# Patient Record
Sex: MALE | Race: BLACK OR AFRICAN AMERICAN | NOT HISPANIC OR LATINO | Employment: FULL TIME | URBAN - NONMETROPOLITAN AREA
[De-identification: names, ages, dates, MRNs, and addresses within clinical notes are randomized per-mention and may not be internally consistent; named-entity substitution may affect disease eponyms.]

---

## 2024-08-20 ENCOUNTER — OFFICE VISIT (OUTPATIENT)
Dept: CARDIOLOGY | Facility: CLINIC | Age: 54
End: 2024-08-20
Payer: COMMERCIAL

## 2024-08-20 VITALS
BODY MASS INDEX: 27.46 KG/M2 | SYSTOLIC BLOOD PRESSURE: 134 MMHG | DIASTOLIC BLOOD PRESSURE: 67 MMHG | HEIGHT: 74 IN | WEIGHT: 214 LBS | HEART RATE: 80 BPM

## 2024-08-20 DIAGNOSIS — E78.2 MIXED HYPERLIPIDEMIA: Primary | ICD-10-CM

## 2024-08-20 DIAGNOSIS — R94.31 ABNORMAL EKG: ICD-10-CM

## 2024-08-20 DIAGNOSIS — I10 PRIMARY HYPERTENSION: ICD-10-CM

## 2024-08-20 PROCEDURE — 99204 OFFICE O/P NEW MOD 45 MIN: CPT | Performed by: INTERNAL MEDICINE

## 2024-08-20 PROCEDURE — 93000 ELECTROCARDIOGRAM COMPLETE: CPT | Performed by: INTERNAL MEDICINE

## 2024-08-20 RX ORDER — ATORVASTATIN CALCIUM 10 MG/1
1 TABLET, FILM COATED ORAL DAILY
COMMUNITY
Start: 2024-08-12

## 2024-08-20 RX ORDER — AMLODIPINE BESYLATE 5 MG/1
5 TABLET ORAL DAILY
COMMUNITY
Start: 2024-05-24

## 2024-08-20 RX ORDER — CETIRIZINE HYDROCHLORIDE 10 MG/1
1 TABLET ORAL DAILY
COMMUNITY
Start: 2024-07-29

## 2024-08-20 RX ORDER — ONDANSETRON 4 MG/1
4 TABLET, FILM COATED ORAL
COMMUNITY

## 2024-08-20 RX ORDER — OMEPRAZOLE 40 MG/1
1 CAPSULE, DELAYED RELEASE ORAL DAILY
COMMUNITY
Start: 2024-07-29

## 2024-08-20 RX ORDER — TESTOSTERONE CYPIONATE 200 MG/ML
INJECTION, SOLUTION INTRAMUSCULAR
COMMUNITY
Start: 2024-07-29

## 2024-08-20 RX ORDER — MONTELUKAST SODIUM 10 MG/1
10 TABLET ORAL
COMMUNITY
Start: 2024-07-29

## 2024-08-20 NOTE — PROGRESS NOTES
Chief Complaint  Abnormal ECG and Establish Care    Subjective        Alvin Sam presents to [facility] CARDIOLOGY  History of present illness:    Patient is a 54-year-old male with past medical history significant for hypertension and hyperlipidemia.  The patient is active golfing and working around the house but does not have a regular exercise program.  When he is walking around he notes no chest pain.  He went to his primary care's office to be set up for a colonoscopy.  An EKG was done that looked mildly abnormal.  He does not smoke.  He does drink up to 4 drinks of alcohol a day.  Mother had a history of pacemaker and father had a history of stents.  He notes no cardiac symptoms.      History reviewed. No pertinent past medical history.      History reviewed. No pertinent surgical history.       Social History     Socioeconomic History    Marital status:    Tobacco Use    Smoking status: Never    Smokeless tobacco: Never   Vaping Use    Vaping status: Never Used   Substance and Sexual Activity    Alcohol use: Yes    Drug use: Never    Sexual activity: Defer         History reviewed. No pertinent family history.       Allergies   Allergen Reactions    Penicillins Hives            Current Outpatient Medications:     amLODIPine (NORVASC) 5 MG tablet, Take 1 tablet by mouth Daily. for blood pressure, Disp: , Rfl:     atorvastatin (LIPITOR) 10 MG tablet, Take 1 tablet by mouth Daily., Disp: , Rfl:     cetirizine (zyrTEC) 10 MG tablet, Take 1 tablet by mouth Daily., Disp: , Rfl:     montelukast (SINGULAIR) 10 MG tablet, Take 1 tablet by mouth every night at bedtime., Disp: , Rfl:     omeprazole (priLOSEC) 40 MG capsule, Take 1 capsule by mouth Daily., Disp: , Rfl:     ondansetron (ZOFRAN) 4 MG tablet, Take 1 tablet by mouth., Disp: , Rfl:     Testosterone Cypionate (DEPOTESTOTERONE CYPIONATE) 200 MG/ML injection, inject 1ml INTRAMUSCULARLY EVERY MONTH, Disp: , Rfl:       ROS:  Cardiac  "review of systems negative.    Objective     /67   Pulse 80   Ht 188 cm (74\")   Wt 97.1 kg (214 lb)   BMI 27.48 kg/m²       General Appearance:   well developed  well nourished  HENT:   oropharynx moist  lips not cyanotic  Respiratory:  no respiratory distress  normal breath sounds  no rales  Cardiovascular:  no jugular venous distention  regular rhythm  S1 normal, S2 normal  no S3, no S4   no murmur  no rub, no thrill  No carotid bruit  pedal pulses normal  lower extremity edema: none    Musculoskeletal:  no clubbing of fingers.   normocephalic, head atraumatic  Skin:   warm, dry  Psychiatric:  judgement and insight appropriate  normal mood and affect    ECHO:    STRESS:    CATH:  No results found for this or any previous visit.    BMP:   No results found for: \"GLUCOSE\", \"BUN\", \"CREATININE\", \"NA\", \"K\", \"CL\", \"CO2\", \"CALCIUM\", \"BCR\", \"ANIONGAP\", \"EGFR\"  LIPIDS:  No results found for: \"CHOL\", \"TRIG\", \"HDL\", \"LDL\", \"VLDL\", \"LDLHDL\"        ECG 12 Lead    Date/Time: 8/20/2024 12:52 PM  Performed by: Hunter Murphy MD    Authorized by: Hunter Murphy MD  Comparison: compared with previous ECG from 5/23/2024  Similar to previous ECG  Rhythm: sinus rhythm                   ASSESSMENT:  Diagnoses and all orders for this visit:    1. Mixed hyperlipidemia (Primary)    2. Primary hypertension    3. Abnormal EKG         PLAN:    1.  I tried to look at the EKG from primary care's office but the copy was very poor.  I rechecked an EKG here today.  This shows normal sinus rhythm with no significant abnormalities.  He does not have interventricular conduction delay as his QRS duration is only 93 ms.  I think it looks very similar to the one I saw from back in 2013.  2.  Courage the patient to start a regular exercise program 30 minutes 5 days a week and call us if any exertional chest pain.  3.  Blood pressures under good control.  4.  Continue the Lipitor.  Patient had cholesterol checked 5/23/2024 with " LDL 73, HDL 55, and triglycerides 141.  These are under excellent control.  5.  Patient does not smoke.  6.  Encouraged the patient to limit his alcohol use to 2 drinks per night.  7.  From a cardiac standpoint patient's modifiable risk factors are under excellent control.  I do think he can just call us as needed.      Return if symptoms worsen or fail to improve.     Patient was given instructions and counseling regarding his condition or for health maintenance advice. Please see specific information pulled into the AVS if appropriate.         Hunter Murphy MD   8/20/2024  10:41 EDT